# Patient Record
Sex: MALE
[De-identification: names, ages, dates, MRNs, and addresses within clinical notes are randomized per-mention and may not be internally consistent; named-entity substitution may affect disease eponyms.]

---

## 2021-01-28 PROBLEM — Z00.00 ENCOUNTER FOR PREVENTIVE HEALTH EXAMINATION: Status: ACTIVE | Noted: 2021-01-28

## 2021-03-30 ENCOUNTER — APPOINTMENT (OUTPATIENT)
Dept: PULMONOLOGY | Facility: CLINIC | Age: 61
End: 2021-03-30
Payer: COMMERCIAL

## 2021-03-30 ENCOUNTER — APPOINTMENT (OUTPATIENT)
Dept: UROLOGY | Facility: CLINIC | Age: 61
End: 2021-03-30
Payer: COMMERCIAL

## 2021-03-30 VITALS
BODY MASS INDEX: 22.35 KG/M2 | SYSTOLIC BLOOD PRESSURE: 122 MMHG | HEIGHT: 72 IN | WEIGHT: 165 LBS | DIASTOLIC BLOOD PRESSURE: 79 MMHG | HEART RATE: 78 BPM

## 2021-03-30 VITALS — OXYGEN SATURATION: 100 %

## 2021-03-30 VITALS
SYSTOLIC BLOOD PRESSURE: 122 MMHG | DIASTOLIC BLOOD PRESSURE: 78 MMHG | HEART RATE: 79 BPM | HEIGHT: 72 IN | BODY MASS INDEX: 22.35 KG/M2 | WEIGHT: 165 LBS | TEMPERATURE: 98.6 F

## 2021-03-30 DIAGNOSIS — Z87.891 PERSONAL HISTORY OF NICOTINE DEPENDENCE: ICD-10-CM

## 2021-03-30 DIAGNOSIS — N13.8 BENIGN PROSTATIC HYPERPLASIA WITH LOWER URINARY TRACT SYMPMS: ICD-10-CM

## 2021-03-30 DIAGNOSIS — Z86.39 PERSONAL HISTORY OF OTHER ENDOCRINE, NUTRITIONAL AND METABOLIC DISEASE: ICD-10-CM

## 2021-03-30 DIAGNOSIS — R91.8 OTHER NONSPECIFIC ABNORMAL FINDING OF LUNG FIELD: ICD-10-CM

## 2021-03-30 DIAGNOSIS — Z80.42 FAMILY HISTORY OF MALIGNANT NEOPLASM OF PROSTATE: ICD-10-CM

## 2021-03-30 DIAGNOSIS — Z87.442 PERSONAL HISTORY OF URINARY CALCULI: ICD-10-CM

## 2021-03-30 DIAGNOSIS — N40.1 BENIGN PROSTATIC HYPERPLASIA WITH LOWER URINARY TRACT SYMPMS: ICD-10-CM

## 2021-03-30 PROCEDURE — 94010 BREATHING CAPACITY TEST: CPT

## 2021-03-30 PROCEDURE — 94729 DIFFUSING CAPACITY: CPT

## 2021-03-30 PROCEDURE — 94726 PLETHYSMOGRAPHY LUNG VOLUMES: CPT

## 2021-03-30 PROCEDURE — 99204 OFFICE O/P NEW MOD 45 MIN: CPT | Mod: 25

## 2021-03-30 PROCEDURE — 99204 OFFICE O/P NEW MOD 45 MIN: CPT

## 2021-03-30 PROCEDURE — ZZZZZ: CPT

## 2021-03-30 PROCEDURE — 99072 ADDL SUPL MATRL&STAF TM PHE: CPT

## 2021-03-30 RX ORDER — ATORVASTATIN CALCIUM 40 MG/1
40 TABLET, FILM COATED ORAL
Refills: 0 | Status: ACTIVE | COMMUNITY

## 2021-03-30 RX ORDER — FENOFIBRATE 145 MG/1
145 TABLET, COATED ORAL
Refills: 0 | Status: ACTIVE | COMMUNITY

## 2021-03-30 RX ORDER — SERTRALINE HYDROCHLORIDE 25 MG/1
TABLET, FILM COATED ORAL
Refills: 0 | Status: ACTIVE | COMMUNITY

## 2021-03-30 RX ORDER — METFORMIN HYDROCHLORIDE 1000 MG/1
1000 TABLET, COATED ORAL
Refills: 0 | Status: ACTIVE | COMMUNITY

## 2021-03-30 NOTE — ASSESSMENT
[FreeTextEntry1] : 60M former smoker with incidentally found 2 small nodules, 2 and 3 mm. \par No clinical symptoms.\par PFTs normal.\par Recommend to repeat CT chest in 1 year per [employer] society guidelines.\par

## 2021-03-30 NOTE — HISTORY OF PRESENT ILLNESS
[TextBox_4] : 60M here for evaluation of pulmonary nodules, referred y DR Анна Hernandez.\par Pt is a former heavy smoker, quit in 2001. Reports good exercise tolerance, no sob or fam, no wheezing. Mostly sedentary.\par No constitutional symptoms. no chronic cough.\par \par

## 2021-03-30 NOTE — HISTORY OF PRESENT ILLNESS
[FreeTextEntry1] : Very pleasant 60-year-old gentleman who presents for evaluation of history of kidney stones, BPH, weak urinary stream.  He reports that he has passed multiple kidney stones throughout his life.  He reports most recently passing stone approximately 6 months ago.  Recent renal ultrasound demonstrated a 3 mm intrarenal stone, however a CT scan following this demonstrated no evidence of kidney stones.  He reports that he typically experiences flank pain when passing a stone.  He has never had a stone analyzed.  He has not undergone a work-up for stones in the past.

## 2021-03-30 NOTE — CONSULT LETTER
[Dear  ___] : Dear  [unfilled], [Consult Letter:] : I had the pleasure of evaluating your patient, [unfilled]. [Please see my note below.] : Please see my note below. [Consult Closing:] : Thank you very much for allowing me to participate in the care of this patient.  If you have any questions, please do not hesitate to contact me. [Sincerely,] : Sincerely, [FreeTextEntry3] : Chela Iverson MD, [facility]

## 2021-03-30 NOTE — REVIEW OF SYSTEMS
[Negative] : Heme/Lymph [Wake up at night to urinate  How many times?  ___] : wakes up to urinate [unfilled] times during the night [FreeTextEntry2] : Kidney stones in left kidney/ enlarged prostate

## 2021-04-05 LAB
ALBUMIN SERPL ELPH-MCNC: 5.1 G/DL
ALP BLD-CCNC: 35 U/L
ALT SERPL-CCNC: 8 U/L
ANION GAP SERPL CALC-SCNC: 15 MMOL/L
AST SERPL-CCNC: 15 U/L
BILIRUB SERPL-MCNC: 0.3 MG/DL
BUN SERPL-MCNC: 17 MG/DL
CALCIUM SERPL-MCNC: 10.4 MG/DL
CALCIUM SERPL-MCNC: 10.4 MG/DL
CHLORIDE SERPL-SCNC: 104 MMOL/L
CO2 SERPL-SCNC: 22 MMOL/L
CREAT SERPL-MCNC: 0.73 MG/DL
GLUCOSE SERPL-MCNC: 109 MG/DL
PARATHYROID HORMONE INTACT: 11 PG/ML
POTASSIUM SERPL-SCNC: 4.3 MMOL/L
PROT SERPL-MCNC: 7.3 G/DL
SODIUM SERPL-SCNC: 141 MMOL/L
TSH SERPL-ACNC: 2.28 UIU/ML
URATE SERPL-MCNC: 3 MG/DL

## 2021-04-30 ENCOUNTER — APPOINTMENT (OUTPATIENT)
Dept: UROLOGY | Facility: CLINIC | Age: 61
End: 2021-04-30
Payer: COMMERCIAL

## 2021-04-30 PROCEDURE — 99215 OFFICE O/P EST HI 40 MIN: CPT

## 2021-04-30 PROCEDURE — 99072 ADDL SUPL MATRL&STAF TM PHE: CPT

## 2021-04-30 RX ORDER — TAMSULOSIN HYDROCHLORIDE 0.4 MG/1
0.4 CAPSULE ORAL
Qty: 90 | Refills: 0 | Status: ACTIVE | COMMUNITY
Start: 2021-03-30 | End: 1900-01-01

## 2021-04-30 NOTE — ASSESSMENT
[FreeTextEntry1] : Very pleasant 60-year-old gentleman who presents for follow-up of kidney stones\par -L[facility]ink urinalysis reviewed with the patient demonstrating significantly elevated urine calcium, significantly elevated urine uric acid\par -Volume 4 L\par -PTH reviewed demonstrating a PTH of 11 in the setting of a normal calcium of 10.4\par -Uric acid reviewed–low\par -TSH normal\par -We had an extensive discussion regarding stone prevention strategies\par -Continue to drink plenty of water\par -Minimize red meat, chicken, and other animal proteins\par -Minimize spinach and other oxalate containing foods\par -Minimize salt\par -Stop vitamin D supplements\par -Repeat Litholink in 6 weeks\par -Follow-up in 8 weeks

## 2021-04-30 NOTE — HISTORY OF PRESENT ILLNESS
[FreeTextEntry1] : Very pleasant 60-year-old gentleman who presents for follow-up of kidney stones.  He recently underwent laboratory analysis which demonstrated a normal TSH, low parathyroid hormone at 11 but normal calcium at 10.4, low serum uric acid.  He underwent a Litholink urinalysis which demonstrated severe hypercalciuria and hyperuricosuria.  He also demonstrated elevated urine oxalate.  He reports no flank pain at this time.  No dysuria.  No hematuria.\par \par Patient reports that he takes a high dose of vitamin D supplements each day.  He also reports that he eats a lot of spinach as well as red meat and chicken.

## 2021-06-30 ENCOUNTER — APPOINTMENT (OUTPATIENT)
Dept: UROLOGY | Facility: CLINIC | Age: 61
End: 2021-06-30
Payer: COMMERCIAL

## 2021-06-30 DIAGNOSIS — N20.0 CALCULUS OF KIDNEY: ICD-10-CM

## 2021-06-30 PROCEDURE — 99213 OFFICE O/P EST LOW 20 MIN: CPT

## 2021-06-30 NOTE — ASSESSMENT
[FreeTextEntry1] : Very pleasant 60-year-old gentleman who presents for follow-up of kidney stones\par -Litholink results reviewed with the patient in detail and compared to prior Litholink.  Litholink demonstrates good urine volume at 4.82 L.  It demonstrates hypercalciuria at 521 with hyperoxaluria as well at 61 and high urine uric acid at 1.735\par -We discussed option to start a thiazide diuretic or other medication and at this time he would like to defer this\par -Renal ultrasound in 6 months\par -Repeat Litholink in 6 months\par -We again discussed general stone prevention strategies as well as those specific to his Litholink profile

## 2021-06-30 NOTE — HISTORY OF PRESENT ILLNESS
[FreeTextEntry1] : Very pleasant 60-year-old gentleman who presents for follow-up of kidney stones.  Patient recently underwent repeat Litholink urinalysis which demonstrated persistently elevated urine calcium and urine oxalate.  Supersaturation of calcium oxalate was noted to be 6.05.  Urine uric acid was noted to be 1.735.  He reports that he has minimized red meat in his diet.  He reports that he has cut out spinach from his diet.  He is drinking a lot of water.  Urine volume was 4.82 L

## 2022-01-18 ENCOUNTER — APPOINTMENT (OUTPATIENT)
Dept: UROLOGY | Facility: CLINIC | Age: 62
End: 2022-01-18

## 2022-01-23 NOTE — ASSESSMENT
[FreeTextEntry1] : Very pleasant 60-year-old gentleman who presents for evaluation of history of kidney stones, BPH, weak urinary stream\par -Discussed the natural history of BPH, as well as typical symptoms of an enlarged prostate. Discussed potential complications that could arise from BPH, including but not limited to urinary tract infections, bladder stones, and renal impairment.\par -Trial of Flomax\par -I discussed the risks, benefits, alternatives, and possible side effects of alpha blocker therapy with the patient, including but not limited to dizziness, lightheadedness, headache, blurred vision, retrograde ejaculation, and priapism with the patient. I also discussed that the patient must inform his ophthalmologist that he has taken an alpha blocker prior to undergoing cataract or glaucoma surgery.\par -L[facility]oLink urinalysis\par -CMP\par -PTH\par -TSH\par -Uric acid\par -Follow-up in 1 month
Patent